# Patient Record
Sex: MALE | Race: WHITE | NOT HISPANIC OR LATINO
[De-identification: names, ages, dates, MRNs, and addresses within clinical notes are randomized per-mention and may not be internally consistent; named-entity substitution may affect disease eponyms.]

---

## 2021-06-02 ENCOUNTER — NON-APPOINTMENT (OUTPATIENT)
Age: 76
End: 2021-06-02

## 2021-06-14 ENCOUNTER — APPOINTMENT (OUTPATIENT)
Dept: PULMONOLOGY | Facility: CLINIC | Age: 76
End: 2021-06-14
Payer: MEDICARE

## 2021-06-14 VITALS
TEMPERATURE: 96.8 F | SYSTOLIC BLOOD PRESSURE: 122 MMHG | OXYGEN SATURATION: 96 % | HEART RATE: 64 BPM | DIASTOLIC BLOOD PRESSURE: 72 MMHG

## 2021-06-14 DIAGNOSIS — Z23 ENCOUNTER FOR IMMUNIZATION: ICD-10-CM

## 2021-06-14 DIAGNOSIS — R06.00 DYSPNEA, UNSPECIFIED: ICD-10-CM

## 2021-06-14 DIAGNOSIS — R91.1 SOLITARY PULMONARY NODULE: ICD-10-CM

## 2021-06-14 PROCEDURE — 94010 BREATHING CAPACITY TEST: CPT

## 2021-06-14 PROCEDURE — 94729 DIFFUSING CAPACITY: CPT

## 2021-06-14 PROCEDURE — 99214 OFFICE O/P EST MOD 30 MIN: CPT | Mod: 25

## 2021-06-14 PROCEDURE — 94727 GAS DIL/WSHOT DETER LNG VOL: CPT

## 2021-06-14 RX ORDER — PREDNISOLONE ACETATE 10 MG/ML
1 SUSPENSION/ DROPS OPHTHALMIC
Qty: 5 | Refills: 0 | Status: ACTIVE | COMMUNITY
Start: 2021-01-07

## 2021-06-14 RX ORDER — TICAGRELOR 90 MG/1
90 TABLET ORAL
Qty: 180 | Refills: 0 | Status: ACTIVE | COMMUNITY
Start: 2020-05-26

## 2021-06-14 RX ORDER — CLONAZEPAM 1 MG/1
1 TABLET ORAL
Qty: 90 | Refills: 0 | Status: ACTIVE | COMMUNITY
Start: 2021-01-06

## 2021-06-14 RX ORDER — LIDOCAINE 5% 700 MG/1
5 PATCH TOPICAL
Qty: 30 | Refills: 0 | Status: ACTIVE | COMMUNITY
Start: 2020-01-07

## 2021-06-14 RX ORDER — INSULIN DEGLUDEC INJECTION 200 U/ML
200 INJECTION, SOLUTION SUBCUTANEOUS
Qty: 18 | Refills: 0 | Status: ACTIVE | COMMUNITY
Start: 2020-06-29

## 2021-06-14 RX ORDER — PITAVASTATIN CALCIUM 4.18 MG/1
4 TABLET, FILM COATED ORAL
Qty: 90 | Refills: 0 | Status: ACTIVE | COMMUNITY
Start: 2021-05-07

## 2021-06-14 RX ORDER — METFORMIN HYDROCHLORIDE 1000 MG/1
1000 TABLET, COATED ORAL
Qty: 180 | Refills: 0 | Status: ACTIVE | COMMUNITY
Start: 2021-01-08

## 2021-06-14 RX ORDER — GABAPENTIN 300 MG/1
300 CAPSULE ORAL
Qty: 540 | Refills: 0 | Status: ACTIVE | COMMUNITY
Start: 2021-05-19

## 2021-06-14 RX ORDER — DABIGATRAN ETEXILATE MESYLATE 150 MG/1
150 CAPSULE ORAL
Qty: 180 | Refills: 0 | Status: ACTIVE | COMMUNITY
Start: 2021-04-27

## 2021-06-14 RX ORDER — CEPHALEXIN 500 MG/1
500 CAPSULE ORAL
Qty: 28 | Refills: 0 | Status: COMPLETED | COMMUNITY
Start: 2021-02-02

## 2021-06-14 RX ORDER — ICOSAPENT ETHYL 1000 MG/1
1 CAPSULE ORAL
Qty: 120 | Refills: 0 | Status: DISCONTINUED | COMMUNITY
Start: 2020-12-01

## 2021-06-14 RX ORDER — VENLAFAXINE HYDROCHLORIDE 150 MG/1
150 CAPSULE, EXTENDED RELEASE ORAL
Qty: 90 | Refills: 0 | Status: ACTIVE | COMMUNITY
Start: 2021-01-08

## 2021-06-14 RX ORDER — TRAMADOL HYDROCHLORIDE 50 MG/1
50 TABLET, COATED ORAL
Qty: 9 | Refills: 0 | Status: DISCONTINUED | COMMUNITY
Start: 2021-02-02

## 2021-06-14 RX ORDER — HYDROCHLOROTHIAZIDE 25 MG/1
25 TABLET ORAL
Qty: 90 | Refills: 0 | Status: ACTIVE | COMMUNITY
Start: 2021-05-24

## 2021-06-14 RX ORDER — ESOMEPRAZOLE MAGNESIUM 40 MG/1
40 CAPSULE, DELAYED RELEASE ORAL
Qty: 90 | Refills: 0 | Status: ACTIVE | COMMUNITY
Start: 2021-04-07

## 2021-06-14 RX ORDER — PEN NEEDLE, DIABETIC 29 G X1/2"
32G X 4 MM NEEDLE, DISPOSABLE MISCELLANEOUS
Qty: 100 | Refills: 0 | Status: ACTIVE | COMMUNITY
Start: 2021-01-04

## 2021-06-14 RX ORDER — VALSARTAN 320 MG/1
320 TABLET, COATED ORAL
Qty: 90 | Refills: 0 | Status: ACTIVE | COMMUNITY
Start: 2021-01-05

## 2021-06-14 RX ORDER — EMPAGLIFLOZIN 25 MG/1
25 TABLET, FILM COATED ORAL
Qty: 90 | Refills: 0 | Status: ACTIVE | COMMUNITY
Start: 2021-01-13

## 2021-06-14 RX ORDER — EZETIMIBE 10 MG/1
10 TABLET ORAL
Qty: 90 | Refills: 0 | Status: DISCONTINUED | COMMUNITY
Start: 2021-05-07

## 2021-06-14 NOTE — ASSESSMENT
[FreeTextEntry1] : 1) The patent is doing well except a new cough and RIOS   2) he will have a PFt and a Ct of the chest  3) the patient had a PFT and the results were normal  No new finding to explain his Dyspnea  4) he is s/p a CVA and has an impaired gait  that limits his activities  5) he does not have any findings on PE that would suggest any fibrosis or COpD  6) f/u after the Ct scan

## 2021-06-14 NOTE — HISTORY OF PRESENT ILLNESS
[Former] : former [Never] : never [TextBox_4] : the patient is 75 year M with a distant hx of heavy smoking for 10 yrs  He has now had cts scans for  a number of years  The patient is having more dyspnea The stress test at his cardiologist is normal  He had his last stent was 3 yrs ago  He has not had sx which he had when he had the stents  He has a dry cough and  has to clear his throat Retired [employer] sales.  [TextBox_11] : .2 [TextBox_13] : 10 [YearQuit] : 1975

## 2021-06-14 NOTE — PHYSICAL EXAM
[No Acute Distress] : no acute distress [Normal Rate/Rhythm] : normal rate/rhythm [Normal S1, S2] : normal s1, s2 [No Resp Distress] : no resp distress [Clear to Auscultation Bilaterally] : clear to auscultation bilaterally [No Cyanosis] : no cyanosis [No Edema] : no edema [Normal Color/ Pigmentation] : normal color/ pigmentation [No Focal Deficits] : no focal deficits [Oriented x3] : oriented x3 [TextBox_54] : systolic murmur

## 2021-06-14 NOTE — REASON FOR VISIT
[Follow-Up] : a follow-up visit [Pulmonary Nodules] : pulmonary nodules [TextBox_44] : pt did not have chest CT done

## 2021-06-14 NOTE — REVIEW OF SYSTEMS
[Recent Wt Loss (___ Lbs)] : ~T recent [unfilled] lb weight loss [Cough] : cough [Dyspnea] : dyspnea [SOB on Exertion] : sob on exertion [Negative] : Endocrine [TextBox_91] : arthritis neck

## 2023-09-15 ENCOUNTER — OFFICE (OUTPATIENT)
Facility: LOCATION | Age: 78
Setting detail: OPHTHALMOLOGY
End: 2023-09-15
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
## 2023-09-15 PROCEDURE — NO SHOW FE NO SHOW FEE: Performed by: OPHTHALMOLOGY

## 2023-09-28 ENCOUNTER — OFFICE (OUTPATIENT)
Facility: LOCATION | Age: 78
Setting detail: OPHTHALMOLOGY
End: 2023-09-28
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
## 2023-09-28 PROCEDURE — NO SHOW FE NO SHOW FEE: Performed by: OPHTHALMOLOGY

## 2023-10-18 ENCOUNTER — RX ONLY (RX ONLY)
Age: 78
End: 2023-10-18

## 2023-10-18 ENCOUNTER — OFFICE (OUTPATIENT)
Facility: LOCATION | Age: 78
Setting detail: OPHTHALMOLOGY
End: 2023-10-18
Payer: MEDICARE

## 2023-10-18 DIAGNOSIS — H26.493: ICD-10-CM

## 2023-10-18 DIAGNOSIS — H52.4: ICD-10-CM

## 2023-10-18 DIAGNOSIS — H43.12: ICD-10-CM

## 2023-10-18 DIAGNOSIS — H34.8120: ICD-10-CM

## 2023-10-18 DIAGNOSIS — E11.3313: ICD-10-CM

## 2023-10-18 PROBLEM — H52.11 MYOPIA; RIGHT EYE: Status: ACTIVE | Noted: 2023-10-18

## 2023-10-18 PROCEDURE — 92015 DETERMINE REFRACTIVE STATE: CPT | Mod: GA | Performed by: OPHTHALMOLOGY

## 2023-10-18 PROCEDURE — 92014 COMPRE OPH EXAM EST PT 1/>: CPT | Performed by: OPHTHALMOLOGY

## 2023-10-18 PROCEDURE — 92250 FUNDUS PHOTOGRAPHY W/I&R: CPT | Performed by: OPHTHALMOLOGY

## 2023-10-18 ASSESSMENT — TONOMETRY
OS_IOP_MMHG: 14
OD_IOP_MMHG: 14

## 2023-10-18 ASSESSMENT — LID EXAM ASSESSMENTS
OD_BLEPHARITIS: 1+
OD_COMMENTS: ROSACEA
OS_BLEPHARITIS: 1+
OS_COMMENTS: ROSACEA

## 2023-10-18 ASSESSMENT — CONFRONTATIONAL VISUAL FIELD TEST (CVF)
OD_FINDINGS: FULL
OS_FINDINGS: FULL

## 2023-10-18 ASSESSMENT — CORNEAL DYSTROPHY - POSTERIOR
OS_POSTERIORDYSTROPHY: GUTTATA
OD_POSTERIORDYSTROPHY: GUTTATA

## 2023-11-01 PROBLEM — H34.8120: Status: ACTIVE | Noted: 2023-10-18

## 2023-11-01 ASSESSMENT — REFRACTION_CURRENTRX
OD_SPHERE: -1.50
OS_AXIS: 041
OS_SPHERE: PLANO
OD_CYLINDER: +1.00
OS_CYLINDER: +1.00
OS_OVR_VA: 20/
OD_AXIS: 004
OD_OVR_VA: 20/

## 2023-11-01 ASSESSMENT — REFRACTION_TRIALFRAME
OD_VA2: 20/30(J2)
OD_SPHERE: -1.50
OU_VA: 20/50-2
OD_CYLINDER: +1.50
OS_SPHERE: BALANCE
OD_VA1: 20/50-2
OS_ADD: +2.50
OD_AXIS: 005
OS_VA1: 20/CF
OD_ADD: +2.50

## 2023-11-01 ASSESSMENT — VISUAL ACUITY
OD_BCVA: 20/CF
OS_BCVA: 20/60-2

## 2024-02-20 ENCOUNTER — OFFICE (OUTPATIENT)
Facility: LOCATION | Age: 79
Setting detail: OPHTHALMOLOGY
End: 2024-02-20
Payer: MEDICARE

## 2024-02-20 DIAGNOSIS — Z94.7: ICD-10-CM

## 2024-02-20 DIAGNOSIS — H26.493: ICD-10-CM

## 2024-02-20 DIAGNOSIS — Z96.1: ICD-10-CM

## 2024-02-20 DIAGNOSIS — H02.403: ICD-10-CM

## 2024-02-20 PROCEDURE — 99213 OFFICE O/P EST LOW 20 MIN: CPT | Performed by: OPHTHALMOLOGY

## 2024-02-20 ASSESSMENT — LID POSITION - PTOSIS
OD_PTOSIS: T
OS_PTOSIS: 1+ 2+

## 2024-02-20 ASSESSMENT — LID EXAM ASSESSMENTS
OS_COMMENTS: ROSACEA
OD_BLEPHARITIS: 1+
OD_COMMENTS: ROSACEA
OS_BLEPHARITIS: 1+

## 2024-02-20 ASSESSMENT — CONFRONTATIONAL VISUAL FIELD TEST (CVF)
OS_FINDINGS: FULL
OD_FINDINGS: FULL

## 2024-02-22 PROBLEM — H02.403 PTOSIS; BOTH EYES: Status: ACTIVE | Noted: 2024-02-20

## 2024-02-22 PROBLEM — Z94.7 CORNEAL TRANSPLANT STATUS ; LEFT EYE: Status: ACTIVE | Noted: 2024-02-20

## 2024-02-22 ASSESSMENT — REFRACTION_TRIALFRAME
OD_CYLINDER: +1.50
OS_SPHERE: BALANCE
OD_AXIS: 005
OD_VA2: 20/30(J2)
OD_VA1: 20/50-2
OD_ADD: +2.50
OU_VA: 20/50-2
OS_VA1: 20/CF
OS_ADD: +2.50
OD_SPHERE: -1.50

## 2024-02-22 ASSESSMENT — REFRACTION_CURRENTRX
OD_SPHERE: -1.50
OS_AXIS: 041
OS_OVR_VA: 20/
OD_OVR_VA: 20/
OD_CYLINDER: +1.00
OS_CYLINDER: +1.00
OS_SPHERE: PLANO
OD_AXIS: 004

## 2024-02-22 ASSESSMENT — CORNEAL DYSTROPHY - POSTERIOR
OS_POSTERIORDYSTROPHY: GUTTATA
OD_POSTERIORDYSTROPHY: GUTTATA